# Patient Record
(demographics unavailable — no encounter records)

---

## 2024-10-24 NOTE — PHYSICAL EXAM
[Chaperone Present] : A chaperone was present in the examining room during all aspects of the physical examination [23184] : A chaperone was present during the pelvic exam. [FreeTextEntry2] : MORENA Babin [Appropriately responsive] : appropriately responsive [Alert] : alert [No Acute Distress] : no acute distress [Soft] : soft [Non-tender] : non-tender [Non-distended] : non-distended [Oriented x3] : oriented x3 [FreeTextEntry6] : refused exam, followed by Dr Le,  [Vulvar Atrophy] : vulvar atrophy [Labia Majora] : normal [Labia Minora] : normal [Atrophy] : atrophy [Normal] : normal [Uterine Adnexae] : normal

## 2024-10-24 NOTE — DISCUSSION/SUMMARY
[FreeTextEntry1] : pt never had colonoscopy, gave GI referral today  mammo ordered through breast specialist

## 2024-10-24 NOTE — HISTORY OF PRESENT ILLNESS
[FreeTextEntry1] : pt presents for annual visit c/o vulva/vaginal "discomfort, irritation" in menopause LMP age 47 after endometrial ablation [Previously active] : previously active

## 2025-02-10 NOTE — PHYSICAL EXAM

## 2025-02-10 NOTE — HISTORY OF PRESENT ILLNESS
[FreeTextEntry1] : 63yoF pmhx: arrhythmia. Presents today for CRC screening.   States she experiences intermittent constipation since going through menopause. States she changed her diet, and it has been easier to pass bowel movements, drinking more water and increased fiber intake.   Patient denies any abdominal pain, nausea, vomiting, dysphagia, heart burn, unintentional weight loss, diarrhea, melena, hematochezia. She has been following with cardiology for arrthymias.   Never had an EGD/Colonoscopy. No known family h/o colon ca.   Labs Reviewed 10/8/24 GFR: 98 Hgb: 147.7 HctL 43.4 LFTs WNL  TSH: 2.4

## 2025-02-10 NOTE — END OF VISIT
[FreeTextEntry3] :  Pt seen/examined, agree with above findings and plan as outlined by Sayra Rondon NP.  [Time Spent: ___ minutes] : I have spent [unfilled] minutes of time on the encounter which excludes teaching and separately reported services.

## 2025-02-10 NOTE — ASSESSMENT
[FreeTextEntry1] : 63yoF pmhx: arrhythmia. Presents today for CRC screening.   #CRC Screening -Will schedule colonoscopy -Risks vs. benefits discussed -Educated on generic suprep and Dulcolax Prep -Will need cardiac clearance from Dr. Oliver prior to procedure  -adequate hydration and high fiber diet  -Low residue diet 4-5 days prior to colonoscopy   Follow up after procedure  Pt agreeable with plan

## 2025-04-21 NOTE — PHYSICAL EXAM
[MA] : MA [Appropriately responsive] : appropriately responsive [Alert] : alert [No Acute Distress] : no acute distress [Oriented x3] : oriented x3 [Vulvar Atrophy] : vulvar atrophy [Labia Majora] : normal [Labia Minora] : normal [Atrophy] : atrophy [Normal] : normal [Uterine Adnexae] : normal [FreeTextEntry2] : MORENA Babin

## 2025-04-21 NOTE — HISTORY OF PRESENT ILLNESS
[Y] : Positive pregnancy history [Currently In Menopause] : currently in menopause [Post-Menopause, No Sxs] : post-menopausal, currently without symptoms [Menopause Age: ____] : age at menopause was [unfilled] [No] : Patient does not have concerns regarding sex [Previously active] : previously active [FreeTextEntry1] : follow up visit, started estradiol vaginal gel 6 months ago, was doing well on it, stopped on her own after 1 month, was nervous, felt "sensitivity". Started replens and working well. feels bladder pressure, saw urologist, has f/u appt Followed by Dr Le for breast surveillance, dense, nodules, has markers [PGxTotal] : 1 [[facility]] : 1 [TextBox_6] : vaginal dryness